# Patient Record
Sex: FEMALE | Race: WHITE | NOT HISPANIC OR LATINO | Employment: FULL TIME | URBAN - METROPOLITAN AREA
[De-identification: names, ages, dates, MRNs, and addresses within clinical notes are randomized per-mention and may not be internally consistent; named-entity substitution may affect disease eponyms.]

---

## 2024-06-27 ENCOUNTER — OFFICE VISIT (OUTPATIENT)
Dept: OBSTETRICS AND GYNECOLOGY | Facility: CLINIC | Age: 34
End: 2024-06-27
Payer: COMMERCIAL

## 2024-06-27 VITALS
BODY MASS INDEX: 35.16 KG/M2 | WEIGHT: 211 LBS | DIASTOLIC BLOOD PRESSURE: 70 MMHG | HEIGHT: 65 IN | SYSTOLIC BLOOD PRESSURE: 134 MMHG

## 2024-06-27 DIAGNOSIS — R10.2 PELVIC PAIN: Primary | ICD-10-CM

## 2024-06-27 PROCEDURE — 1036F TOBACCO NON-USER: CPT | Performed by: OBSTETRICS & GYNECOLOGY

## 2024-06-27 PROCEDURE — 99213 OFFICE O/P EST LOW 20 MIN: CPT | Performed by: OBSTETRICS & GYNECOLOGY

## 2024-06-27 RX ORDER — ERGOCALCIFEROL 1.25 MG/1
1 CAPSULE ORAL
COMMUNITY
Start: 2022-04-28

## 2024-06-27 ASSESSMENT — ENCOUNTER SYMPTOMS
ANOREXIA: 1
DIARRHEA: 0
NAUSEA: 0
HEADACHES: 0
ABDOMINAL PAIN: 1
FLATUS: 0
BELCHING: 0
FREQUENCY: 0
ARTHRALGIAS: 0
DYSURIA: 0
FEVER: 0
HEMATURIA: 0
WEIGHT LOSS: 0
CONSTIPATION: 0
MYALGIAS: 0
VOMITING: 0
HEMATOCHEZIA: 0

## 2024-06-27 NOTE — PROGRESS NOTES
"Subjective   Patient ID: Annie Monsalve is a 34 y.o. female who presents for Abdominal Pain (Cramping ).    Patient states she woke up on Sunday with excruciating pain.  Was 10 out of 10  Was bilateral pelvic pain.  Did not radiate to her back or her legs  Was 10 out of 10  No nausea or vomiting  No fevers chills or sweats  No diarrhea or constipation  Cycles are every month  LMP was June 6.  Status post tubal ligation    Pain has resolved since yesterday  Was also fully bloated  Did not take any pain medication      ROS  All Normal Review of Systems  Constitutional: no fever, no chills, no recent weight gain, no recent weight loss and no fatigue.    Gastrointestinal: no abdominal pain, no constipation, no nausea, no diarrhea and no vomiting.    Genitourinary: no dysuria, no urinary incontinence, no vaginal dryness, no vaginal itching, no dyspareunia, no pelvic pain, no dysmenorrhea, no sexual problems, no change in urinary frequency, no vaginal discharge, no unexplained vaginal bleeding and no lesion/sore.    Breasts: No masses.  No nipple discharge.  No redness    Objective   /70   Ht 1.651 m (5' 5\")   Wt 95.7 kg (211 lb)   LMP 06/06/2024   BMI 35.11 kg/m²    Physical Exam  General: No distress.  Alert and oriented  Abdomen: Soft, nontender, nondistended  External Genitalia:  no masses.  no erythema.  no discharge noted.  Vagina:  no masses.      Cervix: no masses.    Uterus:  normal size and contour.  no masses. palpated  Adnexa: R: no masses, non tender.    Adnexa: L: no masses.  non tender  Extremities: No swelling  Psych: No sadness.      Assessment/Plan   1) pelvic pain  Normal exam today with slight discomfort on the patient's left lower quadrant  Ultrasound ordered  Advised Tylenol or Motrin as needed for pain      Thank you for your visit to our office today.     Answers submitted by the patient for this visit:  Abdominal Pain Questionnaire (Submitted on 6/27/2024)  Chief Complaint: Abdominal " pain  Chronicity: new  Onset: in the past 7 days  Onset quality: sudden  Frequency: constantly  Episode duration: 3 Days  Progression since onset: gradually improving  Pain location: generalized abdominal region  Pain - numeric: 9/10  Pain quality: sharp  Radiates to: pelvis  anorexia: Yes  arthralgias: No  belching: No  constipation: No  diarrhea: No  dysuria: No  fever: No  flatus: No  frequency: No  headaches: No  hematochezia: No  hematuria: No  melena: No  myalgias: No  nausea: No  weight loss: No  vomiting: No  Aggravated by: eating, movement  Relieved by: being still

## 2024-08-19 ENCOUNTER — APPOINTMENT (OUTPATIENT)
Dept: OBSTETRICS AND GYNECOLOGY | Facility: CLINIC | Age: 34
End: 2024-08-19
Payer: COMMERCIAL

## 2025-04-16 ENCOUNTER — APPOINTMENT (OUTPATIENT)
Dept: PRIMARY CARE | Facility: CLINIC | Age: 35
End: 2025-04-16
Payer: COMMERCIAL

## 2025-04-16 VITALS
SYSTOLIC BLOOD PRESSURE: 155 MMHG | TEMPERATURE: 98 F | WEIGHT: 209.8 LBS | HEIGHT: 65 IN | DIASTOLIC BLOOD PRESSURE: 89 MMHG | BODY MASS INDEX: 34.95 KG/M2 | HEART RATE: 75 BPM | OXYGEN SATURATION: 100 %

## 2025-04-16 DIAGNOSIS — Z13.6 SCREENING FOR CARDIOVASCULAR CONDITION: ICD-10-CM

## 2025-04-16 DIAGNOSIS — E55.9 VITAMIN D DEFICIENCY: ICD-10-CM

## 2025-04-16 DIAGNOSIS — Z13.29 SCREENING FOR THYROID DISORDER: ICD-10-CM

## 2025-04-16 DIAGNOSIS — Z13.0 SCREENING FOR DEFICIENCY ANEMIA: ICD-10-CM

## 2025-04-16 DIAGNOSIS — Z00.00 HEALTHCARE MAINTENANCE: Primary | ICD-10-CM

## 2025-04-16 DIAGNOSIS — Z13.1 SCREENING FOR DIABETES MELLITUS: ICD-10-CM

## 2025-04-16 PROBLEM — N92.0 MENORRHAGIA WITH REGULAR CYCLE: Status: RESOLVED | Noted: 2025-04-16 | Resolved: 2025-04-16

## 2025-04-16 PROCEDURE — 3008F BODY MASS INDEX DOCD: CPT | Performed by: NURSE PRACTITIONER

## 2025-04-16 PROCEDURE — 99385 PREV VISIT NEW AGE 18-39: CPT | Performed by: NURSE PRACTITIONER

## 2025-04-16 ASSESSMENT — ENCOUNTER SYMPTOMS
BRUISES/BLEEDS EASILY: 0
NAUSEA: 0
RHINORRHEA: 0
DIARRHEA: 0
NERVOUS/ANXIOUS: 0
FREQUENCY: 0
SPEECH DIFFICULTY: 0
DYSPHORIC MOOD: 0
PHOTOPHOBIA: 0
EYE REDNESS: 0
EYE PAIN: 0
FEVER: 0
BACK PAIN: 0
WEAKNESS: 0
FATIGUE: 0
COUGH: 0
DYSURIA: 0
SORE THROAT: 0
ADENOPATHY: 0
DIFFICULTY URINATING: 0
CHEST TIGHTNESS: 0
CHILLS: 0
ARTHRALGIAS: 1
NECK PAIN: 0
VOMITING: 0
SINUS PRESSURE: 0
UNEXPECTED WEIGHT CHANGE: 0
DIZZINESS: 0
SHORTNESS OF BREATH: 0
SLEEP DISTURBANCE: 0
ABDOMINAL PAIN: 0
HEMATURIA: 0
NUMBNESS: 0
EYE DISCHARGE: 0
MYALGIAS: 1
CONSTIPATION: 0
HEADACHES: 0
POLYDIPSIA: 0
EYE ITCHING: 0
PALPITATIONS: 0
POLYPHAGIA: 0
BLOOD IN STOOL: 0
WHEEZING: 0

## 2025-04-16 ASSESSMENT — PATIENT HEALTH QUESTIONNAIRE - PHQ9
SUM OF ALL RESPONSES TO PHQ9 QUESTIONS 1 AND 2: 0
2. FEELING DOWN, DEPRESSED OR HOPELESS: NOT AT ALL
1. LITTLE INTEREST OR PLEASURE IN DOING THINGS: NOT AT ALL

## 2025-04-16 NOTE — PATIENT INSTRUCTIONS
Focus on a low sodium/low fat diet (whole grains, fresh fruits and vegetables, lean meats). Avoid foods high in sugar.  Increase exercise as tolerated.  Check fasting labs as ordered.  Follow up in 3 months to recheck blood pressure.

## 2025-04-16 NOTE — PROGRESS NOTES
"Subjective   Annie Monsalve is a 35 y.o. female who presents for New Patient Visit (Establish care with Michelle. Pt reports she doesn't have concerns. Physical today. Pt is not fasting. Would like a referral for GYN. /).    HPI  Last well exam: several years ago  Health has been good in general.   PMH, PSH, FH and social history reviewed today.  Diet:\"could be better\"  Eats a lot of carbs, greasy foods.  Exercise:No scheduled exercise.  Dental: Regular dental exams. Brushes 2 times a day. Flosses 1-2 times a day.  Vision: Last eye exam: 2 years ago   Corrected with glasses  Tobacco Use: None  Alcohol Use: 3 drinks a year  Sexually active: in a long term relationship and sexually active.  LMP: 4/9/2025  Periods are pretty regular and occurring every 28 days roughly  (+) heavy bleeding and cramps.  Duration is about 7 days.  PAP: 8/2022-normal with negative HPV  Requests referral to GYN  Birth control: tubal ligation  Immunizations: UTD  Colonoscopy: No family history of colon cancer in first degree relative.  Mammogram: No family history of breast cancer.    Last labs:ordered today  Diagnosed with MS at age 17     Review of Systems   Constitutional:  Negative for chills, fatigue, fever and unexpected weight change.   HENT:  Negative for congestion, ear pain, hearing loss, nosebleeds, postnasal drip, rhinorrhea, sinus pressure, sore throat and tinnitus.    Eyes:  Negative for photophobia, pain, discharge, redness, itching and visual disturbance.   Respiratory:  Negative for cough, chest tightness, shortness of breath and wheezing.    Cardiovascular:  Negative for chest pain, palpitations and leg swelling.   Gastrointestinal:  Negative for abdominal pain, blood in stool, constipation, diarrhea, nausea and vomiting.   Endocrine: Negative for cold intolerance, heat intolerance, polydipsia, polyphagia and polyuria.   Genitourinary:  Negative for difficulty urinating, dysuria, frequency, hematuria and urgency. " "  Musculoskeletal:  Positive for arthralgias and myalgias. Negative for back pain and neck pain.        Muscle and joint pain in legs from MS. Legs are weak   Skin:  Negative for rash.   Neurological:  Negative for dizziness, syncope, speech difficulty, weakness, numbness and headaches.        Tingling in fingertips at times.  Speech is slurred (chronic) from prior MS exacerbation.  Has to speak slowly or words do not come out properly.  Cannot do quick movements or gets really dizzy.   Hematological:  Negative for adenopathy. Does not bruise/bleed easily.   Psychiatric/Behavioral:  Negative for dysphoric mood and sleep disturbance. The patient is not nervous/anxious.        Objective   /89 (BP Location: Left arm, Patient Position: Sitting) Comment: electronic BP machine in office  Pulse 75   Temp 36.7 °C (98 °F) (Temporal)   Ht 1.652 m (5' 5.04\")   Wt 95.2 kg (209 lb 12.8 oz)   SpO2 100%   BMI 34.87 kg/m²     Physical Exam  Constitutional:       General: She is not in acute distress.     Appearance: Normal appearance. She is not toxic-appearing.   HENT:      Head: Normocephalic and atraumatic.      Right Ear: Tympanic membrane and ear canal normal.      Left Ear: Tympanic membrane and ear canal normal.      Nose: Nose normal.      Mouth/Throat:      Mouth: Mucous membranes are moist.      Pharynx: Oropharynx is clear.   Eyes:      Extraocular Movements: Extraocular movements intact.      Conjunctiva/sclera: Conjunctivae normal.      Pupils: Pupils are equal, round, and reactive to light.   Neck:      Thyroid: No thyroid mass or thyromegaly.   Cardiovascular:      Rate and Rhythm: Normal rate and regular rhythm.      Pulses: Normal pulses.      Heart sounds: Normal heart sounds, S1 normal and S2 normal. No murmur heard.  Pulmonary:      Effort: Pulmonary effort is normal. No respiratory distress.      Breath sounds: Normal breath sounds.   Abdominal:      General: Bowel sounds are normal.      " Palpations: Abdomen is soft.      Tenderness: There is no abdominal tenderness.   Musculoskeletal:         General: Normal range of motion.      Cervical back: Normal range of motion and neck supple.      Right lower leg: No edema.      Left lower leg: No edema.   Lymphadenopathy:      Cervical: No cervical adenopathy.   Skin:     General: Skin is warm and dry.   Neurological:      Mental Status: She is alert and oriented to person, place, and time.      Cranial Nerves: Facial asymmetry present. No cranial nerve deficit.      Sensory: No sensory deficit.      Motor: No weakness.      Coordination: Coordination normal.      Gait: Gait normal.      Deep Tendon Reflexes: Reflexes are normal and symmetric. Reflexes normal.      Comments: (+) weakness on the left (chronic)   Psychiatric:         Attention and Perception: Attention normal.         Mood and Affect: Mood and affect normal.         Speech: Speech normal.         Behavior: Behavior normal.         Thought Content: Thought content normal.         Judgment: Judgment normal.         Assessment/Plan   Problem List Items Addressed This Visit       Vitamin D deficiency    Relevant Orders    Vitamin D 25-Hydroxy,Total (for eval of Vitamin D levels)     Other Visit Diagnoses         Healthcare maintenance    -  Primary    Relevant Orders    Comprehensive Metabolic Panel    CBC and Auto Differential    Lipid Panel    TSH with reflex to Free T4 if abnormal    Hemoglobin A1C    Referral to Gynecology      Screening for deficiency anemia        Relevant Orders    CBC and Auto Differential      Screening for cardiovascular condition        Relevant Orders    Lipid Panel      Screening for diabetes mellitus        Relevant Orders    Hemoglobin A1C      Screening for thyroid disorder        Relevant Orders    TSH with reflex to Free T4 if abnormal        Discussed focusing on a low sodium/low fat diet (whole grains, fresh fruits and vegetables, lean meats). Avoid foods high  in sugar.  Increase exercise as tolerated.  Check fasting labs as ordered.  Follow up in 3 months to recheck blood pressure.     It has been a pleasure seeing you today!

## 2025-04-18 LAB
25(OH)D3+25(OH)D2 SERPL-MCNC: 17 NG/ML (ref 30–100)
ALBUMIN SERPL-MCNC: 4.5 G/DL (ref 3.6–5.1)
ALP SERPL-CCNC: 48 U/L (ref 31–125)
ALT SERPL-CCNC: 15 U/L (ref 6–29)
ANION GAP SERPL CALCULATED.4IONS-SCNC: 10 MMOL/L (CALC) (ref 7–17)
AST SERPL-CCNC: 14 U/L (ref 10–30)
BASOPHILS # BLD AUTO: 80 CELLS/UL (ref 0–200)
BASOPHILS NFR BLD AUTO: 1.1 %
BILIRUB SERPL-MCNC: 0.5 MG/DL (ref 0.2–1.2)
BUN SERPL-MCNC: 9 MG/DL (ref 7–25)
CALCIUM SERPL-MCNC: 9.2 MG/DL (ref 8.6–10.2)
CHLORIDE SERPL-SCNC: 102 MMOL/L (ref 98–110)
CHOLEST SERPL-MCNC: 181 MG/DL
CHOLEST/HDLC SERPL: 3.4 (CALC)
CO2 SERPL-SCNC: 28 MMOL/L (ref 20–32)
CREAT SERPL-MCNC: 0.77 MG/DL (ref 0.5–0.97)
EGFRCR SERPLBLD CKD-EPI 2021: 102 ML/MIN/1.73M2
EOSINOPHIL # BLD AUTO: 190 CELLS/UL (ref 15–500)
EOSINOPHIL NFR BLD AUTO: 2.6 %
ERYTHROCYTE [DISTWIDTH] IN BLOOD BY AUTOMATED COUNT: 13 % (ref 11–15)
EST. AVERAGE GLUCOSE BLD GHB EST-MCNC: 103 MG/DL
EST. AVERAGE GLUCOSE BLD GHB EST-SCNC: 5.7 MMOL/L
GLUCOSE SERPL-MCNC: 107 MG/DL (ref 65–99)
HBA1C MFR BLD: 5.2 %
HCT VFR BLD AUTO: 42.5 % (ref 35–45)
HDLC SERPL-MCNC: 54 MG/DL
HGB BLD-MCNC: 14.1 G/DL (ref 11.7–15.5)
LDLC SERPL CALC-MCNC: 108 MG/DL (CALC)
LYMPHOCYTES # BLD AUTO: 2175 CELLS/UL (ref 850–3900)
LYMPHOCYTES NFR BLD AUTO: 29.8 %
MCH RBC QN AUTO: 28.5 PG (ref 27–33)
MCHC RBC AUTO-ENTMCNC: 33.2 G/DL (ref 32–36)
MCV RBC AUTO: 85.9 FL (ref 80–100)
MONOCYTES # BLD AUTO: 329 CELLS/UL (ref 200–950)
MONOCYTES NFR BLD AUTO: 4.5 %
NEUTROPHILS # BLD AUTO: 4526 CELLS/UL (ref 1500–7800)
NEUTROPHILS NFR BLD AUTO: 62 %
NONHDLC SERPL-MCNC: 127 MG/DL (CALC)
PLATELET # BLD AUTO: 359 THOUSAND/UL (ref 140–400)
PMV BLD REES-ECKER: 9.4 FL (ref 7.5–12.5)
POTASSIUM SERPL-SCNC: 4.1 MMOL/L (ref 3.5–5.3)
PROT SERPL-MCNC: 7.3 G/DL (ref 6.1–8.1)
RBC # BLD AUTO: 4.95 MILLION/UL (ref 3.8–5.1)
SODIUM SERPL-SCNC: 140 MMOL/L (ref 135–146)
TRIGL SERPL-MCNC: 94 MG/DL
TSH SERPL-ACNC: 0.91 MIU/L
WBC # BLD AUTO: 7.3 THOUSAND/UL (ref 3.8–10.8)

## 2025-07-14 ENCOUNTER — APPOINTMENT (OUTPATIENT)
Dept: PRIMARY CARE | Facility: CLINIC | Age: 35
End: 2025-07-14
Payer: COMMERCIAL

## 2025-07-14 VITALS
BODY MASS INDEX: 34.01 KG/M2 | HEART RATE: 77 BPM | OXYGEN SATURATION: 99 % | WEIGHT: 204.6 LBS | DIASTOLIC BLOOD PRESSURE: 75 MMHG | TEMPERATURE: 98 F | SYSTOLIC BLOOD PRESSURE: 115 MMHG

## 2025-07-14 DIAGNOSIS — R03.0 ELEVATED BLOOD PRESSURE READING: Primary | ICD-10-CM

## 2025-07-14 DIAGNOSIS — E55.9 VITAMIN D DEFICIENCY: ICD-10-CM

## 2025-07-14 PROCEDURE — 1036F TOBACCO NON-USER: CPT | Performed by: NURSE PRACTITIONER

## 2025-07-14 PROCEDURE — 99213 OFFICE O/P EST LOW 20 MIN: CPT | Performed by: NURSE PRACTITIONER

## 2025-07-14 RX ORDER — ACETAMINOPHEN 500 MG
1 TABLET ORAL DAILY
COMMUNITY

## 2025-07-14 ASSESSMENT — ENCOUNTER SYMPTOMS
NUMBNESS: 0
VOMITING: 0
SHORTNESS OF BREATH: 0
FEVER: 0
SPEECH DIFFICULTY: 1
CHILLS: 0
PALPITATIONS: 0
WEAKNESS: 0
MYALGIAS: 1
DIZZINESS: 0
FATIGUE: 0
ABDOMINAL PAIN: 0
DIARRHEA: 0
HEADACHES: 0
COUGH: 0
CHEST TIGHTNESS: 0
NAUSEA: 0
ARTHRALGIAS: 1

## 2025-07-14 NOTE — PROGRESS NOTES
"Subjective   Annie Monsalve is a 35 y.o. female who presents for 3 mon fu.    HPI  She presents to the office today for a blood pressure follow up. BP is much better today with dietary changes.  No chest pain, shortness of breath, palpitations or edema. No new headaches, numbness, tingling, weakness or vision changes.  No dizziness.  Home blood pressure readings: 110-120/70-80  Diet: Cut out daily Davis's and has been watching sodium intake  Changed seasoning to those without salt.   Exercise: was walking regularly weather permitting.  Weight: Down 5 lbs since last visit  Down 11 lbs on home scale.    Last labs: Reviewed labs from 4/17/2025 (CMP, CBC, TSH, lipid, hemoglobin A1C, Vitamin D level)  Lab Results   Component Value Date    CHOL 181 04/17/2025     Lab Results   Component Value Date    HDL 54 04/17/2025     Lab Results   Component Value Date    LDLCALC 108 (H) 04/17/2025     Lab Results   Component Value Date    TRIG 94 04/17/2025     No components found for: \"CHOLHDL\"   Lab Results   Component Value Date    WBC 7.3 04/17/2025    HGB 14.1 04/17/2025    HCT 42.5 04/17/2025    MCV 85.9 04/17/2025     04/17/2025      Lab Results   Component Value Date    GLUCOSE 107 (H) 04/17/2025    CALCIUM 9.2 04/17/2025     04/17/2025    K 4.1 04/17/2025    CO2 28 04/17/2025     04/17/2025    BUN 9 04/17/2025    CREATININE 0.77 04/17/2025      Lab Results   Component Value Date    TSH 0.91 04/17/2025      Lab Results   Component Value Date    HGBA1C 5.2 04/17/2025        Review of Systems   Constitutional:  Negative for chills, fatigue and fever.   Eyes:  Negative for visual disturbance.   Respiratory:  Negative for cough, chest tightness and shortness of breath.    Cardiovascular:  Negative for chest pain, palpitations and leg swelling.   Gastrointestinal:  Negative for abdominal pain, diarrhea, nausea and vomiting.   Musculoskeletal:  Positive for arthralgias and myalgias.   Neurological:  " Positive for speech difficulty. Negative for dizziness, weakness, numbness and headaches.       Objective   /75 (BP Location: Left arm, Patient Position: Sitting) Comment: electronic BP machine  Pulse 77   Temp 36.7 °C (98 °F) (Temporal)   Wt 92.8 kg (204 lb 9.6 oz)   SpO2 99%   BMI 34.01 kg/m²     Physical Exam  Constitutional:       General: She is not in acute distress.     Appearance: Normal appearance. She is not toxic-appearing.   Eyes:      Extraocular Movements: Extraocular movements intact.      Conjunctiva/sclera: Conjunctivae normal.      Pupils: Pupils are equal, round, and reactive to light.   Cardiovascular:      Rate and Rhythm: Normal rate and regular rhythm.      Pulses: Normal pulses.      Heart sounds: Normal heart sounds, S1 normal and S2 normal. No murmur heard.  Pulmonary:      Effort: Pulmonary effort is normal. No respiratory distress.      Breath sounds: Normal breath sounds.   Musculoskeletal:      Right lower leg: No edema.      Left lower leg: No edema.   Lymphadenopathy:      Cervical: No cervical adenopathy.   Neurological:      Mental Status: She is alert and oriented to person, place, and time.   Psychiatric:         Attention and Perception: Attention normal.         Mood and Affect: Mood and affect normal.         Behavior: Behavior normal. Behavior is cooperative.         Thought Content: Thought content normal.         Cognition and Memory: Cognition normal.         Judgment: Judgment normal.         Assessment/Plan   Problem List Items Addressed This Visit       Vitamin D deficiency    Relevant Orders    Vitamin D 25-Hydroxy,Total (for eval of Vitamin D levels)     Other Visit Diagnoses         Elevated blood pressure reading    -  Primary        Will check an updated Vitamin D level and adjust the supplement as needed. Blood pressure much better with dietary changes. Will plan to follow up in 4/2026 for physical.    It has been a pleasure seeing you today!

## 2025-07-14 NOTE — PATIENT INSTRUCTIONS
Continue to focus on a healthy diet and exercise.   Check updated Vitamin d level.  Follow up for physical in April.

## 2025-08-11 ENCOUNTER — APPOINTMENT (OUTPATIENT)
Dept: OBSTETRICS AND GYNECOLOGY | Facility: CLINIC | Age: 35
End: 2025-08-11
Payer: COMMERCIAL

## 2025-08-11 VITALS
SYSTOLIC BLOOD PRESSURE: 140 MMHG | BODY MASS INDEX: 34.26 KG/M2 | DIASTOLIC BLOOD PRESSURE: 90 MMHG | WEIGHT: 206.13 LBS

## 2025-08-11 DIAGNOSIS — Z12.4 ENCOUNTER FOR SCREENING FOR MALIGNANT NEOPLASM OF CERVIX: ICD-10-CM

## 2025-08-11 DIAGNOSIS — Z01.419 ENCOUNTER FOR WELL WOMAN EXAM WITH ROUTINE GYNECOLOGICAL EXAM: Primary | ICD-10-CM

## 2025-08-11 DIAGNOSIS — Z11.51 SCREENING FOR HUMAN PAPILLOMAVIRUS (HPV): ICD-10-CM

## 2025-08-11 PROCEDURE — 99385 PREV VISIT NEW AGE 18-39: CPT | Performed by: NURSE PRACTITIONER

## 2025-08-11 PROCEDURE — 88175 CYTOPATH C/V AUTO FLUID REDO: CPT

## 2025-08-11 PROCEDURE — 87626 HPV SEP HI-RSK TYP&POOL RSLT: CPT

## 2026-04-15 ENCOUNTER — APPOINTMENT (OUTPATIENT)
Dept: PRIMARY CARE | Facility: CLINIC | Age: 36
End: 2026-04-15
Payer: COMMERCIAL